# Patient Record
Sex: FEMALE | Race: WHITE | NOT HISPANIC OR LATINO | Employment: FULL TIME | URBAN - METROPOLITAN AREA
[De-identification: names, ages, dates, MRNs, and addresses within clinical notes are randomized per-mention and may not be internally consistent; named-entity substitution may affect disease eponyms.]

---

## 2017-10-06 ENCOUNTER — OFFICE VISIT (OUTPATIENT)
Dept: OBSTETRICS AND GYNECOLOGY | Facility: CLINIC | Age: 32
End: 2017-10-06

## 2017-10-06 VITALS
SYSTOLIC BLOOD PRESSURE: 112 MMHG | WEIGHT: 155 LBS | DIASTOLIC BLOOD PRESSURE: 70 MMHG | BODY MASS INDEX: 22.19 KG/M2 | HEIGHT: 70 IN

## 2017-10-06 DIAGNOSIS — Z01.419 WELL WOMAN EXAM WITH ROUTINE GYNECOLOGICAL EXAM: Primary | ICD-10-CM

## 2017-10-06 PROCEDURE — 99385 PREV VISIT NEW AGE 18-39: CPT | Performed by: OBSTETRICS & GYNECOLOGY

## 2017-10-06 NOTE — PROGRESS NOTES
Subjective   Brittany Barragan is a 32 y.o. female is here today as a self referral for annual.    History of Present Illness-here today for annual exam and checkup.  No contraception needed,  had vasectomy.    The following portions of the patient's history were reviewed and updated as appropriate: allergies, current medications, past family history, past medical history, past social history, past surgical history and problem list.    Review of Systems   Constitutional: Negative.    HENT: Negative.    Eyes: Negative.    Respiratory: Negative.    Cardiovascular: Negative.    Gastrointestinal: Negative.    Endocrine: Negative.    Genitourinary: Negative.    Musculoskeletal: Negative.    Skin: Negative.    Allergic/Immunologic: Negative.    Neurological: Negative.    Hematological: Negative.    Psychiatric/Behavioral: Negative.        Objective   Physical Exam   Constitutional: She is oriented to person, place, and time. She appears well-developed and well-nourished.   HENT:   Head: Normocephalic and atraumatic.   Nose: Nose normal.   Eyes: Conjunctivae and EOM are normal. Pupils are equal, round, and reactive to light.   Neck: Normal range of motion. Neck supple. No thyromegaly present.   Cardiovascular: Normal rate, regular rhythm, normal heart sounds and intact distal pulses.  Exam reveals no gallop.    No murmur heard.  Pulmonary/Chest: Effort normal and breath sounds normal. No respiratory distress. She has no wheezes. She exhibits no mass, no tenderness, no swelling and no retraction. Right breast exhibits no inverted nipple, no mass, no nipple discharge, no skin change and no tenderness. Left breast exhibits no inverted nipple, no mass, no nipple discharge, no skin change and no tenderness.   Abdominal: Soft. Bowel sounds are normal. She exhibits no distension and no mass. There is no tenderness.   Genitourinary: Rectum normal, vagina normal and uterus normal. There is no rash, tenderness, lesion or  injury on the right labia. There is no rash, tenderness, lesion or injury on the left labia. Uterus is not enlarged and not tender. Cervix exhibits no motion tenderness and no discharge. Right adnexum displays no mass, no tenderness and no fullness. Left adnexum displays no mass, no tenderness and no fullness.   Musculoskeletal: Normal range of motion. She exhibits no edema, tenderness or deformity.   Neurological: She is alert and oriented to person, place, and time.   Skin: Skin is warm and dry.   Psychiatric: She has a normal mood and affect. Her behavior is normal. Judgment and thought content normal.   Nursing note and vitals reviewed.        Assessment/Plan   Problems Addressed this Visit     None      Visit Diagnoses     Well woman exam with routine gynecological exam    -  Primary    Relevant Orders    IGP,rfx Aptima HPV All Pth - ThinPrep Vial, Cervix        Pap smear done today.

## 2017-10-10 LAB
CONV .: NORMAL
CYTOLOGIST CVX/VAG CYTO: NORMAL
CYTOLOGY CVX/VAG DOC THIN PREP: NORMAL
DX ICD CODE: NORMAL
HIV 1 & 2 AB SER-IMP: NORMAL
Lab: NORMAL
OTHER STN SPEC: NORMAL
PATH REPORT.FINAL DX SPEC: NORMAL
STAT OF ADQ CVX/VAG CYTO-IMP: NORMAL

## 2018-11-06 ENCOUNTER — OFFICE VISIT (OUTPATIENT)
Dept: OBSTETRICS AND GYNECOLOGY | Facility: CLINIC | Age: 33
End: 2018-11-06

## 2018-11-06 VITALS
BODY MASS INDEX: 23.62 KG/M2 | HEIGHT: 70 IN | DIASTOLIC BLOOD PRESSURE: 78 MMHG | WEIGHT: 165 LBS | SYSTOLIC BLOOD PRESSURE: 118 MMHG

## 2018-11-06 DIAGNOSIS — Z01.419 WELL WOMAN EXAM WITH ROUTINE GYNECOLOGICAL EXAM: Primary | ICD-10-CM

## 2018-11-06 DIAGNOSIS — Z34.02 ENCOUNTER FOR SUPERVISION OF NORMAL FIRST PREGNANCY IN SECOND TRIMESTER: ICD-10-CM

## 2018-11-06 LAB
BILIRUB BLD-MCNC: NEGATIVE MG/DL
CLARITY, POC: CLEAR
COLOR UR: YELLOW
GLUCOSE UR STRIP-MCNC: NEGATIVE MG/DL
KETONES UR QL: NEGATIVE
LEUKOCYTE EST, POC: NEGATIVE
NITRITE UR-MCNC: NEGATIVE MG/ML
PH UR: 7 [PH] (ref 5–8)
PROT UR STRIP-MCNC: NEGATIVE MG/DL
RBC # UR STRIP: NEGATIVE /UL
SP GR UR: 1.02 (ref 1–1.03)
UROBILINOGEN UR QL: NORMAL

## 2018-11-06 PROCEDURE — 81003 URINALYSIS AUTO W/O SCOPE: CPT | Performed by: NURSE PRACTITIONER

## 2018-11-06 PROCEDURE — 99395 PREV VISIT EST AGE 18-39: CPT | Performed by: NURSE PRACTITIONER

## 2018-11-06 RX ORDER — LEVONORGESTREL AND ETHINYL ESTRADIOL 0.1-0.02MG
1 KIT ORAL DAILY
Qty: 90 TABLET | Refills: 3 | Status: SHIPPED | OUTPATIENT
Start: 2018-11-06 | End: 2019-06-19

## 2018-11-06 NOTE — PROGRESS NOTES
[facility]  Routine Annual Visit    2018    Patient: Brittany Barragan          MR#:[id number]      History of Present Illness    33 y.o. female  who presents for annual exam. Last pap negative 10/2017. Using husbands vasectomy for contraception. Reports normal, monthly cycles- no change. Denies new medical hx. She does request to go back on oral contraceptives for cycle control and PMS symptoms. Has been on and off OCPs in the past with good experience.    No LMP recorded.  Obstetric History:  OB History      Para Term  AB Living    2 2 2     2    SAB TAB Ectopic Molar Multiple Live Births                        Menstrual History:     No LMP recorded.       Sexual History:       ________________________________________  There is no problem list on file for this patient.      Past Medical History:   Diagnosis Date   • Seasonal allergies        Past Surgical History:   Procedure Laterality Date   • BREAST AUGMENTATION     • NASAL POLYP SURGERY         History   Smoking Status   • Never Smoker   Smokeless Tobacco   • Never Used       Family History   Problem Relation Age of Onset   • Heart disease Father        Prior to Admission medications    Medication Sig Start Date End Date Taking? Authorizing Provider   Multiple Vitamin (MULTI VITAMIN DAILY PO) Take  by mouth.   Yes Provider, MD Niki     ________________________________________    The following portions of the patient's history were reviewed and updated as appropriate: allergies, current medications, past family history, past medical history, past social history, past surgical history and problem list.    Review of Systems   Constitutional: Negative.    HENT: Negative.    Eyes: Negative for visual disturbance.   Respiratory: Negative for cough, shortness of breath and wheezing.    Cardiovascular: Negative for chest pain, palpitations and leg swelling.   Gastrointestinal: Negative for abdominal distention,  "abdominal pain, blood in stool, constipation, diarrhea, nausea and vomiting.   Endocrine: Negative for cold intolerance and heat intolerance.   Genitourinary: Negative for difficulty urinating, dyspareunia, dysuria, frequency, genital sores, hematuria, menstrual problem, pelvic pain, urgency, vaginal bleeding, vaginal discharge and vaginal pain.   Musculoskeletal: Negative.    Skin: Negative.    Neurological: Negative for dizziness, weakness, light-headedness, numbness and headaches.   Hematological: Negative.    Psychiatric/Behavioral: Negative.    Breasts: negative for lumps skin changes, dimpling, swelling, nipple changes/discharge bilaterally       Objective   Physical Exam    Ht 177.8 cm (70\")   Wt 74.8 kg (165 lb)   BMI 23.68 kg/m²    BP Readings from Last 3 Encounters:   10/06/17 112/70   08/24/16 100/68      Wt Readings from Last 3 Encounters:   11/06/18 74.8 kg (165 lb)   10/06/17 70.3 kg (155 lb)   08/24/16 69.9 kg (154 lb)        BMI: Estimated body mass index is 23.68 kg/m² as calculated from the following:    Height as of this encounter: 177.8 cm (70\").    Weight as of this encounter: 74.8 kg (165 lb).      General:   alert, appears stated age and cooperative   Heart: regular rate and rhythm, S1, S2 normal, no murmur, click, rub or gallop   Lungs: clear to auscultation bilaterally   Abdomen: soft, non-tender, without masses or organomegaly   Breast: inspection negative, no nipple discharge or bleeding, no masses or nodularity palpable   Vulva: normal   Vagina: normal mucosa, normal discharge   Cervix: no cervical motion tenderness and no lesions   Uterus: normal size, mobile, non-tender or normal shape and consistency   Adnexa: no mass, fullness, tenderness     Assessment:    1. Normal annual exam   2. OCP: initiation of contraceptive pills. We have discussed the risks and benefits of oral contraceptives. We have discussed the health benefits of oral contraceptives including improvement of acne, " dysmenorrhea, PMS, decreased risk ovarian and uterine cancer later in life and decreased menstrual flow or suppression. We have also discussed possible side effects including nausea or vomiting at initiation, breast tenderness, and increased risk deep vein thrombosis. We have reviewed the ACHES (abdominal pain, chest pain, headache, leg pain, vision changes, shortness of breath) and to notify our office in the event of any of these symptoms.   3. Healthy lifestyle modifications discussed, counseled on self breast exams and bone health      Plan:    []  Rx:   []  Mammogram request made  [x]  PAP done  []  Occult fecal blood test ([facility])  []  Labs:   []  GC/Chl/TV  []  DEXA scan   []  Referral for colonoscopy:           AUDI Lane  11/6/2018 11:23 AM

## 2018-11-08 ENCOUNTER — TELEPHONE (OUTPATIENT)
Dept: OBSTETRICS AND GYNECOLOGY | Age: 33
End: 2018-11-08

## 2018-11-08 LAB
CYTOLOGIST CVX/VAG CYTO: NORMAL
CYTOLOGY CVX/VAG DOC THIN PREP: NORMAL
DX ICD CODE: NORMAL
HIV 1 & 2 AB SER-IMP: NORMAL
HPV I/H RISK 4 DNA CVX QL PROBE+SIG AMP: NEGATIVE
OTHER STN SPEC: NORMAL
PATH REPORT.FINAL DX SPEC: NORMAL
STAT OF ADQ CVX/VAG CYTO-IMP: NORMAL

## 2018-11-08 NOTE — TELEPHONE ENCOUNTER
----- Message from AUDI Mendiola sent at 11/8/2018 12:58 PM EST -----  Please inform patient her pap smear returned negative (normal). Thank you.

## 2019-01-04 ENCOUNTER — OFFICE VISIT (OUTPATIENT)
Dept: OBSTETRICS AND GYNECOLOGY | Facility: CLINIC | Age: 34
End: 2019-01-04

## 2019-01-04 VITALS
WEIGHT: 160 LBS | BODY MASS INDEX: 22.9 KG/M2 | HEIGHT: 70 IN | SYSTOLIC BLOOD PRESSURE: 108 MMHG | DIASTOLIC BLOOD PRESSURE: 64 MMHG

## 2019-01-04 DIAGNOSIS — N92.0 MENORRHAGIA WITH REGULAR CYCLE: Primary | ICD-10-CM

## 2019-01-04 PROCEDURE — 99215 OFFICE O/P EST HI 40 MIN: CPT | Performed by: OBSTETRICS & GYNECOLOGY

## 2019-01-04 NOTE — PROGRESS NOTES
HPI   Brittany Barragan  is a 33 y.o. female who presents for evaluation of menorrhagia as well as hot flashes, night sweats and mood swings.  She reports that she has experienced hot flashes and night sweats for several years.  She also has increasingly heavy periods.  She was evaluated by AUDI Mae.  She was started on oral contraceptive pills for control of her symptoms.  This has improved her cycle somewhat, but it has not improved the hot flashes, night sweats and mood swings.  The patient denies any other use of medications.  She denies any recent significant weight change.  Denies headaches or vision changes.  She does experience fatigue.  She also expresses cold intolerance.  This has neither worsened, nor has it improved in several years.    Chief Complaint   Patient presents with   • Follow-up     Patient is here for night sweats and is on birth control wants to discuss the birth control still having heavy cramping.       Past Medical History:   Diagnosis Date   • Seasonal allergies        Past Surgical History:   Procedure Laterality Date   • BREAST AUGMENTATION     • NASAL POLYP SURGERY         Social History     Socioeconomic History   • Marital status:      Spouse name: Karthik   • Number of children: 2   • Years of education: Not on file   • Highest education level: Not on file   Social Needs   • Financial resource strain: Not on file   • Food insecurity - worry: Not on file   • Food insecurity - inability: Not on file   • Transportation needs - medical: Not on file   • Transportation needs - non-medical: Not on file   Occupational History   • Occupation:    Tobacco Use   • Smoking status: Never Smoker   • Smokeless tobacco: Never Used   Substance and Sexual Activity   • Alcohol use: Yes     Comment: social drinker   • Drug use: No   • Sexual activity: Yes     Partners: Male     Birth control/protection: None   Other Topics Concern   • Not on file   Social History  Narrative   • Not on file       The following portions of the patient's history were reviewed and updated as appropriate: allergies, current medications, past family history, past medical history, past social history, past surgical history and problem list.    Review of Systems this is positive for night sweats, mood swings and hot flashes.  It is positive for cold intolerance and fatigue.  It is positive for menorrhagia.  It is negative for dysmenorrhea or dyspareunia.  All other systems are reviewed and are negative          Physical Exam   Constitutional: She appears well-developed and well-nourished.   Nursing note and vitals reviewed.      Assessment    Brittany was seen today for follow-up.    Diagnoses and all orders for this visit:    Menorrhagia with regular cycle  -     TSH  -     Prolactin        Plan  1. Menorrhagia with mood swings, hot flashes and night sweats.  Also cold intolerance and fatigue.  I counseled the patient regarding possible causes of this.  30 minutes of a 45 minute visit were spent in direct face-to-face counseling on this issue.  The patient does not require contraception, as her  has a vasectomy.  She would like to stop her oral contraceptive pills.  I agree with this thought.  Also, her symptoms are consistent with possible hypothyroidism.  I recommend a thyroid-stimulating hormone as well as prolactin today.  If this shows evidence of hypothyroidism, endocrine referral can be made.  If the tests are negative, we can check a follicle-stimulating hormone after the patient is been off of her oral contraceptive pills for approximately 1 month.  Also, an ultrasound can be performed to assess the endometrium and the adnexa.  If all of this is normal, efforts can be made to control symptoms.  Otherwise, efforts will be focused on the cause of the problem.  I answered the patient's questions and she agrees with these recommendations.    Social History     Tobacco Use   Smoking Status  Never Smoker   2.     3.

## 2019-01-05 LAB
PROLACTIN SERPL-MCNC: 6.5 NG/ML (ref 4.8–23.3)
TSH SERPL DL<=0.005 MIU/L-ACNC: 1.61 MIU/ML (ref 0.27–4.2)

## 2019-01-09 ENCOUNTER — TELEPHONE (OUTPATIENT)
Dept: OBSTETRICS AND GYNECOLOGY | Facility: CLINIC | Age: 34
End: 2019-01-09

## 2019-04-12 ENCOUNTER — TELEPHONE (OUTPATIENT)
Dept: OBSTETRICS AND GYNECOLOGY | Facility: CLINIC | Age: 34
End: 2019-04-12

## 2019-04-12 NOTE — TELEPHONE ENCOUNTER
In order to do an endometrial ablation, the patient will need preop counseling as well as an ultrasound and an endometrial biopsy.  I could not find where any of those things have already been done.  Please contact the patient and schedule her for a visit.  We can do the ultrasound and the endometrial biopsy at that visit.  Thank you.

## 2019-04-12 NOTE — TELEPHONE ENCOUNTER
Pt called, after last visit with you. Pt would like to go ahead with ablation procedure. Do you need to see pt in office? Does pt akity u/s or anything? Please advise.       Pt callback: [phone]

## 2019-05-21 ENCOUNTER — OFFICE VISIT (OUTPATIENT)
Dept: OBSTETRICS AND GYNECOLOGY | Facility: CLINIC | Age: 34
End: 2019-05-21

## 2019-05-21 ENCOUNTER — PREP FOR SURGERY (OUTPATIENT)
Dept: OTHER | Facility: HOSPITAL | Age: 34
End: 2019-05-21

## 2019-05-21 ENCOUNTER — PROCEDURE VISIT (OUTPATIENT)
Dept: OBSTETRICS AND GYNECOLOGY | Facility: CLINIC | Age: 34
End: 2019-05-21

## 2019-05-21 VITALS
WEIGHT: 165 LBS | HEIGHT: 70 IN | BODY MASS INDEX: 23.62 KG/M2 | DIASTOLIC BLOOD PRESSURE: 64 MMHG | SYSTOLIC BLOOD PRESSURE: 116 MMHG

## 2019-05-21 DIAGNOSIS — N92.0 MENORRHAGIA WITH REGULAR CYCLE: ICD-10-CM

## 2019-05-21 DIAGNOSIS — N92.0 MENORRHAGIA WITH REGULAR CYCLE: Primary | ICD-10-CM

## 2019-05-21 PROCEDURE — 99214 OFFICE O/P EST MOD 30 MIN: CPT | Performed by: OBSTETRICS & GYNECOLOGY

## 2019-05-21 PROCEDURE — 58100 BIOPSY OF UTERUS LINING: CPT | Performed by: OBSTETRICS & GYNECOLOGY

## 2019-05-21 PROCEDURE — 76830 TRANSVAGINAL US NON-OB: CPT | Performed by: OBSTETRICS & GYNECOLOGY

## 2019-05-21 RX ORDER — SODIUM CHLORIDE 0.9 % (FLUSH) 0.9 %
3-10 SYRINGE (ML) INJECTION AS NEEDED
Status: CANCELLED | OUTPATIENT
Start: 2019-07-01

## 2019-05-21 RX ORDER — KETOROLAC TROMETHAMINE 15.75 MG/1
SPRAY, METERED NASAL
Qty: 5 EACH | Refills: 0 | Status: SHIPPED | OUTPATIENT
Start: 2019-05-21

## 2019-05-21 RX ORDER — SODIUM CHLORIDE 0.9 % (FLUSH) 0.9 %
3 SYRINGE (ML) INJECTION EVERY 12 HOURS SCHEDULED
Status: CANCELLED | OUTPATIENT
Start: 2019-07-01

## 2019-05-21 NOTE — PROGRESS NOTES
HPI   Brittany Barragan  is a 34 y.o. female who presents to discuss the possibility of an endometrial ablation for her menstrual hemorrhage.  She has tried oral contraceptive pills, a Mirena IUD and Depo-Provera.  All of these have failed.  Ultrasound was performed today.  It was reviewed and discussed with the patient.  Endometrial cavity was 9.34 cm.  There were no adnexal masses.  There is a possible uterine septum.  Also, we discussed my rationale for recommending endometrial biopsy.  We discussed the benefits risks and alternatives to this and I answered the patient's questions.  She chose to proceed.    Chief Complaint   Patient presents with   • Follow-up     Patient is here for a f/u for abnormal heavy periods.       Past Medical History:   Diagnosis Date   • Seasonal allergies        Past Surgical History:   Procedure Laterality Date   • BREAST AUGMENTATION     • NASAL POLYP SURGERY         Social History     Socioeconomic History   • Marital status:      Spouse name: Karthik   • Number of children: 2   • Years of education: Not on file   • Highest education level: Not on file   Occupational History   • Occupation:    Tobacco Use   • Smoking status: Never Smoker   • Smokeless tobacco: Never Used   Substance and Sexual Activity   • Alcohol use: Yes     Comment: social drinker   • Drug use: No   • Sexual activity: Yes     Partners: Male     Birth control/protection: None       The following portions of the patient's history were reviewed and updated as appropriate: allergies, current medications, past family history, past medical history, past social history, past surgical history and problem list.    Review of Systems this is positive for menorrhagia and dysmenorrhea.  It is negative for dyspareunia.  It is negative for unexplained weight change.  It is negative for fever or chills.  All other systems are reviewed and are negative          Physical Exam   Constitutional: She is oriented  to person, place, and time. She appears well-developed and well-nourished.   Abdominal:   Soft, nondistended.  The abdomen is nontender to palpation.  There are no masses palpable   Genitourinary:   Genitourinary Comments: Normal female external genitalia  The vagina is estrogenized and without lesion  The uterus is mobile within the pelvis.  It is nontender  No adnexal masses are palpable    Endometrial biopsy was performed using the Pipelle instrument.  Specimen to pathology.  The patient tolerated the procedure well.   Neurological: She is alert and oriented to person, place, and time.   Skin: Skin is warm and dry.   Psychiatric: She has a normal mood and affect. Her behavior is normal.   Vitals reviewed.      Assessment    Brittany was seen today for follow-up.    Diagnoses and all orders for this visit:    Menorrhagia with regular cycle  -     Endometrial Biopsy    Other orders  -     Ketorolac Tromethamine (SPRIX) 15.75 MG/SPRAY solution; 1 spray to each nostril every 8 hours as needed for pain        Plan  1. Endometrial biopsy was performed as described above.  The patient tolerated the procedure well.  2. Counseled regarding options for the management of menorrhagia.  The patient would like to proceed with an endometrial ablation.  We reviewed diagrams together and discussed the benefits, risks and alternatives.  We also discussed the procedure itself.  25 minutes of a 40-minute visit were spent in direct face-to-face counseling on this issue.  We also discussed perioperative pain control.  We discussed the benefits and risks of using Sprix.  The patient would like to proceed.  A prescription has been sent.    Social History     Tobacco Use   Smoking Status Never Smoker   3.     4.

## 2019-05-29 LAB
DX ICD CODE: NORMAL
PATH REPORT.FINAL DX SPEC: NORMAL
PATH REPORT.GROSS SPEC: NORMAL
PATH REPORT.SITE OF ORIGIN SPEC: NORMAL
PATHOLOGIST NAME: NORMAL
PAYMENT PROCEDURE: NORMAL

## 2019-05-30 PROBLEM — N92.0 MENORRHAGIA WITH REGULAR CYCLE: Status: ACTIVE | Noted: 2019-05-30

## 2019-05-31 ENCOUNTER — TELEPHONE (OUTPATIENT)
Dept: OBSTETRICS AND GYNECOLOGY | Facility: CLINIC | Age: 34
End: 2019-05-31

## 2019-05-31 NOTE — TELEPHONE ENCOUNTER
Pt called for results and upcoming procedure. Informed pt of tentative PAT and procedure date. But would like speak to  one of the schedulers.       Thank you

## 2019-05-31 NOTE — TELEPHONE ENCOUNTER
----- Message from Davi Jaarmillo MD sent at 5/30/2019  4:30 PM EDT -----  Manju - Let her know that her biopsy was normal and there was no cancer.  She should call Dr Steiner next week if she wants to proceed with ablation.  He will be back from vacation.

## 2019-06-05 ENCOUNTER — TELEPHONE (OUTPATIENT)
Dept: OBSTETRICS AND GYNECOLOGY | Facility: CLINIC | Age: 34
End: 2019-06-05

## 2019-06-19 ENCOUNTER — APPOINTMENT (OUTPATIENT)
Dept: PREADMISSION TESTING | Facility: HOSPITAL | Age: 34
End: 2019-06-19

## 2019-06-19 VITALS
TEMPERATURE: 98.3 F | WEIGHT: 161 LBS | OXYGEN SATURATION: 99 % | BODY MASS INDEX: 23.05 KG/M2 | HEART RATE: 66 BPM | HEIGHT: 70 IN | RESPIRATION RATE: 20 BRPM

## 2019-06-19 LAB
ANION GAP SERPL CALCULATED.3IONS-SCNC: 9.3 MMOL/L
BUN BLD-MCNC: 17 MG/DL (ref 6–20)
BUN/CREAT SERPL: 24.3 (ref 7–25)
CALCIUM SPEC-SCNC: 9.2 MG/DL (ref 8.6–10.5)
CHLORIDE SERPL-SCNC: 106 MMOL/L (ref 98–107)
CO2 SERPL-SCNC: 24.7 MMOL/L (ref 22–29)
CREAT BLD-MCNC: 0.7 MG/DL (ref 0.57–1)
DEPRECATED RDW RBC AUTO: 39.8 FL (ref 37–54)
ERYTHROCYTE [DISTWIDTH] IN BLOOD BY AUTOMATED COUNT: 11.8 % (ref 12.3–15.4)
GFR SERPL CREATININE-BSD FRML MDRD: 96 ML/MIN/1.73
GLUCOSE BLD-MCNC: 103 MG/DL (ref 65–99)
HCT VFR BLD AUTO: 38.7 % (ref 34–46.6)
HGB BLD-MCNC: 12.5 G/DL (ref 12–15.9)
MCH RBC QN AUTO: 30 PG (ref 26.6–33)
MCHC RBC AUTO-ENTMCNC: 32.3 G/DL (ref 31.5–35.7)
MCV RBC AUTO: 92.8 FL (ref 79–97)
PLATELET # BLD AUTO: 172 10*3/MM3 (ref 140–450)
PMV BLD AUTO: 10.6 FL (ref 6–12)
POTASSIUM BLD-SCNC: 4.5 MMOL/L (ref 3.5–5.2)
RBC # BLD AUTO: 4.17 10*6/MM3 (ref 3.77–5.28)
SODIUM BLD-SCNC: 140 MMOL/L (ref 136–145)
WBC NRBC COR # BLD: 3.97 10*3/MM3 (ref 3.4–10.8)

## 2019-06-19 PROCEDURE — 85027 COMPLETE CBC AUTOMATED: CPT | Performed by: OBSTETRICS & GYNECOLOGY

## 2019-06-19 PROCEDURE — 36415 COLL VENOUS BLD VENIPUNCTURE: CPT

## 2019-06-19 PROCEDURE — 80048 BASIC METABOLIC PNL TOTAL CA: CPT | Performed by: OBSTETRICS & GYNECOLOGY

## 2019-06-19 RX ORDER — IBUPROFEN 200 MG
400-800 TABLET ORAL EVERY 6 HOURS PRN
COMMUNITY
End: 2019-07-01 | Stop reason: HOSPADM

## 2019-06-19 NOTE — DISCHARGE INSTRUCTIONS
Take the following medications the morning of surgery with a small sip of water:    none    General Instructions:  • Do not eat solid food after midnight the night before surgery.  • You may drink clear liquids day of surgery but must stop at least one hour before your hospital arrival time.  • It is beneficial for you to have a clear drink that contains carbohydrates the day of surgery.  We suggest a 12 to 20 ounce bottle of Gatorade or Powerade for non-diabetic patients or a 12 to 20 ounce bottle of G2 or Powerade Zero for diabetic patients. (Pediatric patients, are not advised to drink a 12 to 20 ounce carbohydrate drink)    Clear liquids are liquids you can see through.  Nothing red in color.     Plain water                               Sports drinks  Sodas                                   Gelatin (Jell-O)  Fruit juices without pulp such as white grape juice and apple juice  Popsicles that contain no fruit or yogurt  Tea or coffee (no cream or milk added)  Gatorade / Powerade  G2 / Powerade Zero    • Infants may have breast milk up to four hours before surgery.  • Infants drinking formula may drink formula up to six hours before surgery.   • Patients who avoid smoking, chewing tobacco and alcohol for 4 weeks prior to surgery have a reduced risk of post-operative complications.  Quit smoking as many days before surgery as you can.  • Do not smoke, use chewing tobacco or drink alcohol the day of surgery.   • If applicable bring your C-PAP/ BI-PAP machine.  • Bring any papers given to you in the doctor’s office.  • Wear clean comfortable clothes and socks.  • Do not wear contact lenses, false eyelashes or make-up.  Bring a case for your glasses.   • Bring crutches or walker if applicable.  • Remove all piercings.  Leave jewelry and any other valuables at home.  • Hair extensions with metal clips must be removed prior to surgery.  • The Pre-Admission Testing nurse will instruct you to bring medications if unable  to obtain an accurate list in Pre-Admission Testing.        If you were given a blood bank ID arm band remember to bring it with you the day of surgery.    Preventing a Surgical Site Infection:  • For 2 to 3 days before surgery, avoid shaving with a razor because the razor can irritate skin and make it easier to develop an infection.    • Any areas of open skin can increase the risk of a post-operative wound infection by allowing bacteria to enter and travel throughout the body.  Notify your surgeon if you have any skin wounds / rashes even if it is not near the expected surgical site.  The area will need assessed to determine if surgery should be delayed until it is healed.  • The night prior to surgery sleep in a clean bed with clean clothing.  Do not allow pets to sleep with you.  • Shower on the morning of surgery using a fresh bar of anti-bacterial soap (such as Dial) and clean washcloth.  Dry with a clean towel and dress in clean clothing.  • Ask your surgeon if you will be receiving antibiotics prior to surgery.  • Make sure you, your family, and all healthcare providers clean their hands with soap and water or an alcohol based hand  before caring for you or your wound.    Day of surgery:7/1/19  Upon arrival, a Pre-op nurse and Anesthesiologist will review your health history, obtain vital signs, and answer questions you may have.  The only belongings needed at this time will be a list of your home medications and if applicable your C-PAP/BI-PAP machine.  If you are staying overnight your family can leave the rest of your belongings in the car and bring them to your room later.  A Pre-op nurse will start an IV and you may receive medication in preparation for surgery, including something to help you relax.  Your family will be able to see you in the Pre-op area.  While you are in surgery your family should notify the waiting room  if they leave the waiting room area and provide a contact  phone number.    Please be aware that surgery does come with discomfort.  We want to make every effort to control your discomfort so please discuss any uncontrolled symptoms with your nurse.   Your doctor will most likely have prescribed pain medications.      If you are going home after surgery you will receive individualized written care instructions before being discharged.  A responsible adult must drive you to and from the hospital on the day of your surgery and stay with you for 24 hours.    If you are staying overnight following surgery, you will be transported to your hospital room following the recovery period.  [facility] has all private rooms.    You have received a list of surgical assistants for your reference.  If you have any questions please call Pre-Admission Testing at [phone].  Deductibles and co-payments are collected on the day of service. Please be prepared to pay the required co-pay, deductible or deposit on the day of service as defined by your plan.

## 2019-07-01 ENCOUNTER — ANESTHESIA EVENT (OUTPATIENT)
Dept: PERIOP | Facility: HOSPITAL | Age: 34
End: 2019-07-01

## 2019-07-01 ENCOUNTER — ANESTHESIA (OUTPATIENT)
Dept: PERIOP | Facility: HOSPITAL | Age: 34
End: 2019-07-01

## 2019-07-01 ENCOUNTER — HOSPITAL ENCOUNTER (OUTPATIENT)
Facility: HOSPITAL | Age: 34
Discharge: HOME | End: 2019-07-01
Attending: OBSTETRICS & GYNECOLOGY | Admitting: OBSTETRICS & GYNECOLOGY

## 2019-07-01 VITALS
HEART RATE: 62 BPM | RESPIRATION RATE: 16 BRPM | TEMPERATURE: 97.9 F | HEIGHT: 70 IN | OXYGEN SATURATION: 98 % | BODY MASS INDEX: 23.55 KG/M2 | SYSTOLIC BLOOD PRESSURE: 115 MMHG | DIASTOLIC BLOOD PRESSURE: 75 MMHG | WEIGHT: 164.46 LBS

## 2019-07-01 DIAGNOSIS — N92.0 MENORRHAGIA WITH REGULAR CYCLE: ICD-10-CM

## 2019-07-01 LAB
B-HCG UR QL: NEGATIVE
INTERNAL NEGATIVE CONTROL: NEGATIVE
INTERNAL POSITIVE CONTROL: POSITIVE
Lab: NORMAL

## 2019-07-01 PROCEDURE — 25010000002 PROPOFOL 10 MG/ML EMULSION: Performed by: NURSE ANESTHETIST, CERTIFIED REGISTERED

## 2019-07-01 PROCEDURE — 25010000002 DEXAMETHASONE PER 1 MG: Performed by: NURSE ANESTHETIST, CERTIFIED REGISTERED

## 2019-07-01 PROCEDURE — 25010000002 MIDAZOLAM PER 1 MG: Performed by: ANESTHESIOLOGY

## 2019-07-01 PROCEDURE — 58563 HYSTEROSCOPY ABLATION: CPT | Performed by: OBSTETRICS & GYNECOLOGY

## 2019-07-01 PROCEDURE — 25010000002 FENTANYL CITRATE (PF) 100 MCG/2ML SOLUTION: Performed by: NURSE ANESTHETIST, CERTIFIED REGISTERED

## 2019-07-01 PROCEDURE — 25010000002 ONDANSETRON PER 1 MG: Performed by: NURSE ANESTHETIST, CERTIFIED REGISTERED

## 2019-07-01 PROCEDURE — 81025 URINE PREGNANCY TEST: CPT | Performed by: OBSTETRICS & GYNECOLOGY

## 2019-07-01 PROCEDURE — 25010000002 ROPIVACAINE PER 1 MG: Performed by: OBSTETRICS & GYNECOLOGY

## 2019-07-01 PROCEDURE — 25010000002 KETOROLAC TROMETHAMINE PER 15 MG: Performed by: NURSE ANESTHETIST, CERTIFIED REGISTERED

## 2019-07-01 PROCEDURE — S0260 H&P FOR SURGERY: HCPCS | Performed by: OBSTETRICS & GYNECOLOGY

## 2019-07-01 RX ORDER — NALOXONE HCL 0.4 MG/ML
0.2 VIAL (ML) INJECTION AS NEEDED
Status: DISCONTINUED | OUTPATIENT
Start: 2019-07-01 | End: 2019-07-01 | Stop reason: HOSPADM

## 2019-07-01 RX ORDER — PROMETHAZINE HYDROCHLORIDE 25 MG/ML
12.5 INJECTION, SOLUTION INTRAMUSCULAR; INTRAVENOUS ONCE AS NEEDED
Status: DISCONTINUED | OUTPATIENT
Start: 2019-07-01 | End: 2019-07-01 | Stop reason: HOSPADM

## 2019-07-01 RX ORDER — ACETAMINOPHEN 325 MG/1
650 TABLET ORAL ONCE AS NEEDED
Status: DISCONTINUED | OUTPATIENT
Start: 2019-07-01 | End: 2019-07-01 | Stop reason: HOSPADM

## 2019-07-01 RX ORDER — OXYCODONE AND ACETAMINOPHEN 7.5; 325 MG/1; MG/1
1 TABLET ORAL ONCE AS NEEDED
Status: DISCONTINUED | OUTPATIENT
Start: 2019-07-01 | End: 2019-07-01 | Stop reason: HOSPADM

## 2019-07-01 RX ORDER — MIDAZOLAM HYDROCHLORIDE 1 MG/ML
1 INJECTION INTRAMUSCULAR; INTRAVENOUS
Status: DISCONTINUED | OUTPATIENT
Start: 2019-07-01 | End: 2019-07-01 | Stop reason: HOSPADM

## 2019-07-01 RX ORDER — EPHEDRINE SULFATE 50 MG/ML
5 INJECTION, SOLUTION INTRAVENOUS ONCE AS NEEDED
Status: DISCONTINUED | OUTPATIENT
Start: 2019-07-01 | End: 2019-07-01 | Stop reason: HOSPADM

## 2019-07-01 RX ORDER — FLUMAZENIL 0.1 MG/ML
0.2 INJECTION INTRAVENOUS AS NEEDED
Status: DISCONTINUED | OUTPATIENT
Start: 2019-07-01 | End: 2019-07-01 | Stop reason: HOSPADM

## 2019-07-01 RX ORDER — DIPHENHYDRAMINE HCL 25 MG
25 CAPSULE ORAL
Status: DISCONTINUED | OUTPATIENT
Start: 2019-07-01 | End: 2019-07-01 | Stop reason: HOSPADM

## 2019-07-01 RX ORDER — SODIUM CHLORIDE 0.9 % (FLUSH) 0.9 %
3-10 SYRINGE (ML) INJECTION AS NEEDED
Status: DISCONTINUED | OUTPATIENT
Start: 2019-07-01 | End: 2019-07-01 | Stop reason: HOSPADM

## 2019-07-01 RX ORDER — FENTANYL CITRATE 50 UG/ML
INJECTION, SOLUTION INTRAMUSCULAR; INTRAVENOUS AS NEEDED
Status: DISCONTINUED | OUTPATIENT
Start: 2019-07-01 | End: 2019-07-01 | Stop reason: SURG

## 2019-07-01 RX ORDER — DEXAMETHASONE SODIUM PHOSPHATE 10 MG/ML
INJECTION INTRAMUSCULAR; INTRAVENOUS AS NEEDED
Status: DISCONTINUED | OUTPATIENT
Start: 2019-07-01 | End: 2019-07-01 | Stop reason: SURG

## 2019-07-01 RX ORDER — SODIUM CHLORIDE, SODIUM LACTATE, POTASSIUM CHLORIDE, CALCIUM CHLORIDE 600; 310; 30; 20 MG/100ML; MG/100ML; MG/100ML; MG/100ML
9 INJECTION, SOLUTION INTRAVENOUS CONTINUOUS
Status: DISCONTINUED | OUTPATIENT
Start: 2019-07-01 | End: 2019-07-01 | Stop reason: HOSPADM

## 2019-07-01 RX ORDER — HYDROCODONE BITARTRATE AND ACETAMINOPHEN 7.5; 325 MG/1; MG/1
1 TABLET ORAL ONCE AS NEEDED
Status: COMPLETED | OUTPATIENT
Start: 2019-07-01 | End: 2019-07-01

## 2019-07-01 RX ORDER — DIPHENHYDRAMINE HYDROCHLORIDE 50 MG/ML
12.5 INJECTION INTRAMUSCULAR; INTRAVENOUS
Status: DISCONTINUED | OUTPATIENT
Start: 2019-07-01 | End: 2019-07-01 | Stop reason: HOSPADM

## 2019-07-01 RX ORDER — PROMETHAZINE HYDROCHLORIDE 25 MG/ML
6.25 INJECTION, SOLUTION INTRAMUSCULAR; INTRAVENOUS
Status: DISCONTINUED | OUTPATIENT
Start: 2019-07-01 | End: 2019-07-01 | Stop reason: HOSPADM

## 2019-07-01 RX ORDER — SODIUM CHLORIDE 9 MG/ML
INJECTION, SOLUTION INTRAVENOUS AS NEEDED
Status: DISCONTINUED | OUTPATIENT
Start: 2019-07-01 | End: 2019-07-01 | Stop reason: HOSPADM

## 2019-07-01 RX ORDER — HYDROMORPHONE HYDROCHLORIDE 1 MG/ML
0.5 INJECTION, SOLUTION INTRAMUSCULAR; INTRAVENOUS; SUBCUTANEOUS
Status: DISCONTINUED | OUTPATIENT
Start: 2019-07-01 | End: 2019-07-01 | Stop reason: HOSPADM

## 2019-07-01 RX ORDER — ONDANSETRON 2 MG/ML
INJECTION INTRAMUSCULAR; INTRAVENOUS AS NEEDED
Status: DISCONTINUED | OUTPATIENT
Start: 2019-07-01 | End: 2019-07-01 | Stop reason: SURG

## 2019-07-01 RX ORDER — PROMETHAZINE HYDROCHLORIDE 25 MG/1
25 SUPPOSITORY RECTAL ONCE AS NEEDED
Status: DISCONTINUED | OUTPATIENT
Start: 2019-07-01 | End: 2019-07-01 | Stop reason: HOSPADM

## 2019-07-01 RX ORDER — ROPIVACAINE HYDROCHLORIDE 5 MG/ML
INJECTION, SOLUTION EPIDURAL; INFILTRATION; PERINEURAL AS NEEDED
Status: DISCONTINUED | OUTPATIENT
Start: 2019-07-01 | End: 2019-07-01 | Stop reason: HOSPADM

## 2019-07-01 RX ORDER — ONDANSETRON 2 MG/ML
4 INJECTION INTRAMUSCULAR; INTRAVENOUS ONCE AS NEEDED
Status: DISCONTINUED | OUTPATIENT
Start: 2019-07-01 | End: 2019-07-01 | Stop reason: HOSPADM

## 2019-07-01 RX ORDER — MIDAZOLAM HYDROCHLORIDE 1 MG/ML
2 INJECTION INTRAMUSCULAR; INTRAVENOUS
Status: DISCONTINUED | OUTPATIENT
Start: 2019-07-01 | End: 2019-07-01 | Stop reason: HOSPADM

## 2019-07-01 RX ORDER — FENTANYL CITRATE 50 UG/ML
50 INJECTION, SOLUTION INTRAMUSCULAR; INTRAVENOUS
Status: DISCONTINUED | OUTPATIENT
Start: 2019-07-01 | End: 2019-07-01 | Stop reason: HOSPADM

## 2019-07-01 RX ORDER — LABETALOL HYDROCHLORIDE 5 MG/ML
5 INJECTION, SOLUTION INTRAVENOUS
Status: DISCONTINUED | OUTPATIENT
Start: 2019-07-01 | End: 2019-07-01 | Stop reason: HOSPADM

## 2019-07-01 RX ORDER — KETOROLAC TROMETHAMINE 30 MG/ML
INJECTION, SOLUTION INTRAMUSCULAR; INTRAVENOUS AS NEEDED
Status: DISCONTINUED | OUTPATIENT
Start: 2019-07-01 | End: 2019-07-01 | Stop reason: SURG

## 2019-07-01 RX ORDER — LIDOCAINE HYDROCHLORIDE 10 MG/ML
0.5 INJECTION, SOLUTION EPIDURAL; INFILTRATION; INTRACAUDAL; PERINEURAL ONCE AS NEEDED
Status: DISCONTINUED | OUTPATIENT
Start: 2019-07-01 | End: 2019-07-01 | Stop reason: HOSPADM

## 2019-07-01 RX ORDER — PROMETHAZINE HYDROCHLORIDE 25 MG/1
25 TABLET ORAL ONCE AS NEEDED
Status: DISCONTINUED | OUTPATIENT
Start: 2019-07-01 | End: 2019-07-01 | Stop reason: HOSPADM

## 2019-07-01 RX ORDER — PROPOFOL 10 MG/ML
VIAL (ML) INTRAVENOUS AS NEEDED
Status: DISCONTINUED | OUTPATIENT
Start: 2019-07-01 | End: 2019-07-01 | Stop reason: SURG

## 2019-07-01 RX ORDER — HYDRALAZINE HYDROCHLORIDE 20 MG/ML
5 INJECTION INTRAMUSCULAR; INTRAVENOUS
Status: DISCONTINUED | OUTPATIENT
Start: 2019-07-01 | End: 2019-07-01 | Stop reason: HOSPADM

## 2019-07-01 RX ORDER — SODIUM CHLORIDE 0.9 % (FLUSH) 0.9 %
1-10 SYRINGE (ML) INJECTION AS NEEDED
Status: DISCONTINUED | OUTPATIENT
Start: 2019-07-01 | End: 2019-07-01 | Stop reason: HOSPADM

## 2019-07-01 RX ORDER — FAMOTIDINE 10 MG/ML
20 INJECTION, SOLUTION INTRAVENOUS ONCE
Status: COMPLETED | OUTPATIENT
Start: 2019-07-01 | End: 2019-07-01

## 2019-07-01 RX ORDER — SODIUM CHLORIDE 0.9 % (FLUSH) 0.9 %
3 SYRINGE (ML) INJECTION EVERY 12 HOURS SCHEDULED
Status: DISCONTINUED | OUTPATIENT
Start: 2019-07-01 | End: 2019-07-01 | Stop reason: HOSPADM

## 2019-07-01 RX ADMIN — MIDAZOLAM HYDROCHLORIDE 1 MG: 1 INJECTION, SOLUTION INTRAMUSCULAR; INTRAVENOUS at 13:34

## 2019-07-01 RX ADMIN — DEXAMETHASONE SODIUM PHOSPHATE 4 MG: 10 INJECTION INTRAMUSCULAR; INTRAVENOUS at 14:07

## 2019-07-01 RX ADMIN — FAMOTIDINE 20 MG: 10 INJECTION INTRAVENOUS at 13:34

## 2019-07-01 RX ADMIN — ONDANSETRON 4 MG: 2 INJECTION INTRAMUSCULAR; INTRAVENOUS at 14:07

## 2019-07-01 RX ADMIN — SODIUM CHLORIDE, POTASSIUM CHLORIDE, SODIUM LACTATE AND CALCIUM CHLORIDE 9 ML/HR: 600; 310; 30; 20 INJECTION, SOLUTION INTRAVENOUS at 13:34

## 2019-07-01 RX ADMIN — FENTANYL CITRATE 50 MCG: 50 INJECTION INTRAMUSCULAR; INTRAVENOUS at 14:04

## 2019-07-01 RX ADMIN — HYDROCODONE BITARTRATE AND ACETAMINOPHEN 1 TABLET: 7.5; 325 TABLET ORAL at 15:10

## 2019-07-01 RX ADMIN — FENTANYL CITRATE 50 MCG: 50 INJECTION INTRAMUSCULAR; INTRAVENOUS at 13:53

## 2019-07-01 RX ADMIN — PROPOFOL 200 MG: 10 INJECTION, EMULSION INTRAVENOUS at 13:53

## 2019-07-01 RX ADMIN — KETOROLAC TROMETHAMINE 30 MG: 30 INJECTION, SOLUTION INTRAMUSCULAR; INTRAVENOUS at 14:19

## 2019-07-01 NOTE — ANESTHESIA PREPROCEDURE EVALUATION
Anesthesia Evaluation     Patient summary reviewed and Nursing notes reviewed                Airway   Mallampati: I  TM distance: >3 FB  Neck ROM: full  No difficulty expected  Dental - normal exam     Pulmonary - negative pulmonary ROS and normal exam   Cardiovascular - negative cardio ROS and normal exam        Neuro/Psych- negative ROS  GI/Hepatic/Renal/Endo - negative ROS     Musculoskeletal (-) negative ROS    Abdominal  - normal exam    Bowel sounds: normal.   Substance History - negative use     OB/GYN negative ob/gyn ROS         Other                        Anesthesia Plan    ASA 1     general     intravenous induction   Anesthetic plan, all risks, benefits, and alternatives have been provided, discussed and informed consent has been obtained with: patient.

## 2019-07-01 NOTE — OP NOTE
Operative Note      Brittany Barragan  34 y.o.  1985  female  [id number]      7/1/2019    Surgeon(s) and Role:     * Karthik Steiner MD - Primary     Pre-op Diagnosis:   Menorrhagia with regular cycle [N92.0]    Post-Op Diagnosis Codes:     * Menorrhagia with regular cycle [N92.0]        Findings/Complications:  Findings.  Small uterine septum.  No complications    Description of procedure:  Procedure(s) and Anesthesia Type:     * HYSTEROSCOPY NOVASURE ENDOMETRIAL ABLATION - General  The patient was taken to the operating room where general anesthesia was induced.  She was then placed in dorsal lithotomy position using the candycane stirrups.  Examination under anesthesia revealed a normal-sized uterus.  It was mobile within the pelvis.  There were no adnexal masses palpable.      The perineum and pelvis were prepped and draped in the usual sterile fashion and a weighted speculum was placed for exposure.  Cervix was grasped with a long Allis clamp and paracervical block was placed using naropine.  The cervix was then dilated using Hanks dilators and the diagnostic mini hysteroscope was introduced into the endometrial cavity.  The cavity was intact.  There was a small uterine septum.  Both tubal ostia were clearly visualized.  There was no pathology in the endometrial cavity aside from the septum.  The hysteroscope was then slowly withdrawn and no additional pathology was encountered on the way out.      The cervix was then sounded using the NovaSure sounding device.  This revealed an endometrial cavity length minus the cervix of 5 cm.  The NovaSure device was then tested and introduced into the endometrial cavity.  An endometrial cavity width of 4.7 was determined.  A cavity integrity test was performed and there was no perforation.  A NovaSure ablation was then performed.  The procedure was continued for 1 minute 15 seconds.  A power of 129 W was used.      The NovaSure device was then removed from the  endometrial cavity.  Hysteroscope was reintroduced.  This showed that the cavity was intact and an excellent ablation had been obtained.  The hysteroscope was then withdrawn.  All instruments were removed and the operative site was examined.  It was hemostatic.  Anesthesia= General    Estimated Blood Loss: 25 mL    Specimens: * No orders in the log *    [unfilled]      Karthik Steiner MD  7/1/2019

## 2019-07-01 NOTE — H&P
H&P Note    Patient Identification:  Name: Brittany Barragan  Age: 34 y.o.  Sex: female  :  1985  MRN: [id number]                       Chief Complaint: Menorrhagia    History of Present Illness:   34-year-old lady who has been followed in the office for menorrhagia.  This did not respond to conservative measures.  We discussed options and the patient would like to proceed with an endometrial ablation.  She has been counseled regarding the benefits, risks and alternatives to this and I have answered her questions.  She would like to proceed.    Problem List:  [unfilled]  Past Medical History:  Past Medical History:   Diagnosis Date   • Seasonal allergies      Past Surgical History:  Past Surgical History:   Procedure Laterality Date   • BREAST AUGMENTATION     • BREAST AUGMENTATION     • EXPLORATORY LAPAROTOMY     • NASAL POLYP SURGERY        Home Meds:  Medications Prior to Admission   Medication Sig Dispense Refill Last Dose   • Multiple Vitamin (MULTI VITAMIN DAILY PO) Take 1 tablet by mouth Daily.   Past Week at Unknown time   • ibuprofen (ADVIL,MOTRIN) 200 MG tablet Take 400-800 mg by mouth Every 6 (Six) Hours As Needed for Mild Pain .   Unknown at Unknown time   • Ketorolac Tromethamine (SPRIX) 15.75 MG/SPRAY solution 1 spray to each nostril every 8 hours as needed for pain 5 each 0 Unknown at Unknown time     Current Meds:   [unfilled]  Allergies:  No Known Allergies  Immunizations:    There is no immunization history on file for this patient.  Social History:   Social History     Tobacco Use   • Smoking status: Never Smoker   • Smokeless tobacco: Never Used   Substance Use Topics   • Alcohol use: Yes     Comment: 10 drinks monthly on weekends      Family History:  Family History   Problem Relation Age of Onset   • Heart disease Father    • Malig Hyperthermia Neg Hx         Review of Systems  A comprehensive review of systems was negative.    Objective:  tMax 24 hrs: Temp (24hrs), Av.1 °F (36.7  °C), Min:98.1 °F (36.7 °C), Max:98.1 °F (36.7 °C)    Vitals Ranges:   Temp:  [98.1 °F (36.7 °C)] 98.1 °F (36.7 °C)  Heart Rate:  [72] 72  Resp:  [18] 18  BP: (115)/(69) 115/69  Intake and Output Last 3 Shifts:   No intake/output data recorded.    Exam:     General Appearance:    Alert, cooperative, no distress, appears stated age   Head:    Normocephalic, without obvious abnormality, atraumatic   Back:     Symmetric, no curvature, ROM normal, no CVA tenderness   Lungs:     Clear to auscultation bilaterally, respirations unlabored   Chest Wall:    No tenderness or deformity    Heart:    Regular rate and rhythm, S1 and S2 normal, no murmur, rub   or gallop       Abdomen:     Soft, non-tender, bowel sounds active all four quadrants,     no masses, no organomegaly           Extremities:   Extremities normal, atraumatic, no cyanosis or edema   Skin:   Skin color, texture, turgor normal, no rashes or lesions   Lymph nodes:   Cervical, supraclavicular, and axillary nodes normal       Data Review:    Lab Results (last 24 hours)     Procedure Component Value Units Date/Time    POC Pregnancy, Urine [[id number]]  (Normal) Collected:  07/01/19 1216    Specimen:  Urine Updated:  07/01/19 1251     HCG, Urine, QL Negative     Lot Number [id number]     Internal Positive Control Positive     Internal Negative Control Negative        Assessment:    Menorrhagia with regular cycle    Menorrhagia      Menorrhagia which is been refractory to conservative management.  Patient would like to proceed with an endometrial ablation.  Plan is for hysteroscopy, dilation curettage, NovaSure endometrial ablation.  She has been counseled regarding the benefits, risks and alternatives to this.  Her questions have been answered.  She would like to proceed.    Plan:  Hysteroscopy, dilation and curettage, NovaSure.    Karthik Steiner MD  7/1/2019

## 2019-07-01 NOTE — ANESTHESIA POSTPROCEDURE EVALUATION
Patient: Brittany Barragan    Procedure Summary     Date:  07/01/19 Room / Location:  [facility] OR 02 / [facility] MAIN OR    Anesthesia Start:  1350 Anesthesia Stop:  1427    Procedure:  HYSTEROSCOPY NOVASURE ENDOMETRIAL ABLATION (N/A Vagina) Diagnosis:       Menorrhagia with regular cycle      (Menorrhagia with regular cycle [N92.0])    Surgeon:  Karthik Steiner MD Provider:  Jose Guillermo MD    Anesthesia Type:  general ASA Status:  1          Anesthesia Type: general  Last vitals  BP   118/78 (07/01/19 1515)   Temp   36.6 °C (97.9 °F) (07/01/19 1430)   Pulse   71 (07/01/19 1515)   Resp   12 (07/01/19 1515)     SpO2   99 % (07/01/19 1515)     Post Anesthesia Care and Evaluation    Patient location during evaluation: PACU  Patient participation: complete - patient participated  Level of consciousness: awake and alert  Pain management: adequate  Airway patency: patent  Anesthetic complications: No anesthetic complications    Cardiovascular status: acceptable  Respiratory status: acceptable  Hydration status: acceptable    Comments: --------------------            07/01/19 1515     --------------------   BP:       118/78     Pulse:      71       Resp:       12       Temp:                SpO2:      99%      --------------------

## 2019-07-01 NOTE — ANESTHESIA PROCEDURE NOTES
Airway  Urgency: elective    Airway not difficult    General Information and Staff    Patient location during procedure: OR  CRNA: Hilda Canales CRNA    Indications and Patient Condition  Indications for airway management: airway protection    Preoxygenated: yes  MILS not maintained throughout  Mask difficulty assessment: 1 - vent by mask    Final Airway Details  Final airway type: supraglottic airway      Successful airway: classic  Size 4    Number of attempts at approach: 1    Additional Comments  Lma insertion appears atraumatic. Dentition intact.

## 2019-07-15 ENCOUNTER — OFFICE VISIT (OUTPATIENT)
Dept: OBSTETRICS AND GYNECOLOGY | Facility: CLINIC | Age: 34
End: 2019-07-15

## 2019-07-15 VITALS
SYSTOLIC BLOOD PRESSURE: 108 MMHG | BODY MASS INDEX: 23.34 KG/M2 | DIASTOLIC BLOOD PRESSURE: 62 MMHG | WEIGHT: 163 LBS | HEIGHT: 70 IN

## 2019-07-15 DIAGNOSIS — Z09 POSTOP CHECK: Primary | ICD-10-CM

## 2019-07-15 PROCEDURE — 99212 OFFICE O/P EST SF 10 MIN: CPT | Performed by: OBSTETRICS & GYNECOLOGY

## 2019-07-15 NOTE — PROGRESS NOTES
HPI   Brittany Barragan  is a 34 y.o. female who presents for a 2-week postoperative checkup.  She had an endometrial ablation.  She is feeling well.  Bowels and bladder are functioning normally.  Stop if pain is completely resolved.  She does continue to have a small amount of gold colored discharge.    Chief Complaint   Patient presents with   • Post-op     Patient is here for a 2 week postop for ablation.       Past Medical History:   Diagnosis Date   • Seasonal allergies        Past Surgical History:   Procedure Laterality Date   • BREAST AUGMENTATION     • BREAST AUGMENTATION     • D&C HYSTEROSCOPY ENDOMETRIAL ABLATION N/A 7/1/2019    Procedure: HYSTEROSCOPY NOVASURE ENDOMETRIAL ABLATION;  Surgeon: Karthik Steiner MD;  Location: [facility];  Service: Obstetrics/Gynecology   • EXPLORATORY LAPAROTOMY     • NASAL POLYP SURGERY         Social History     Socioeconomic History   • Marital status:      Spouse name: Karthik   • Number of children: 2   • Years of education: Not on file   • Highest education level: Not on file   Occupational History   • Occupation:    Tobacco Use   • Smoking status: Never Smoker   • Smokeless tobacco: Never Used   Substance and Sexual Activity   • Alcohol use: Yes     Comment: 10 drinks monthly on weekends   • Drug use: No       The following portions of the patient's history were reviewed and updated as appropriate: allergies, current medications, past family history, past medical history, past social history, past surgical history and problem list.    Review of Systems this is negative for nausea or vomiting.  Negative for fever or chills.  Negative for abdominal or pelvic pain.  All other systems are reviewed and are negative          Physical Exam   Constitutional: She appears well-developed and well-nourished.   Abdominal: Soft. She exhibits no distension and no mass. There is no tenderness.   Genitourinary:   Genitourinary Comments: Normal female external  genitalia  The vagina is estrogenized.  There is a small amount of gold discharge at the vaginal vault  Cervix is normal in appearance.  It is nontender to palpation  The uterus is mobile within the pelvis.  It is normal in size.  It is nontender.  No adnexal masses or tenderness are present   Skin: Skin is warm and dry.   Nursing note and vitals reviewed.      Assessment    Brittany was seen today for post-op.    Diagnoses and all orders for this visit:    Postop check        Plan  1. Appropriate progress, 2 weeks postop.  Okay to resume all normal activities of daily living.  The patient will delay until the discharge has resolved to swim in the lake.  2. Counseled regarding gold colored discharge.  Most likely, this represents normal postoperative discharge after an ablation.  If it does not resolve within the next 2 weeks, the patient will contact me and further evaluation will be performed.    3. Return in about 1 year (around 7/15/2020).    Social History     Tobacco Use   Smoking Status Never Smoker   4.     5.

## 2020-07-17 ENCOUNTER — OFFICE VISIT (OUTPATIENT)
Dept: OBSTETRICS AND GYNECOLOGY | Facility: CLINIC | Age: 35
End: 2020-07-17

## 2020-07-17 VITALS
WEIGHT: 164 LBS | SYSTOLIC BLOOD PRESSURE: 116 MMHG | HEIGHT: 70 IN | BODY MASS INDEX: 23.48 KG/M2 | DIASTOLIC BLOOD PRESSURE: 64 MMHG

## 2020-07-17 DIAGNOSIS — Z00.00 ANNUAL PHYSICAL EXAM: Primary | ICD-10-CM

## 2020-07-17 PROCEDURE — 99395 PREV VISIT EST AGE 18-39: CPT | Performed by: OBSTETRICS & GYNECOLOGY

## 2020-07-17 NOTE — PROGRESS NOTES
HPI   Brittany Barragan  is a 35 y.o. female who presents for a routine gynecologic exam.  She is feeling well.  Bowels and bladder are functioning normally.  The patient has been amenorrheic since her endometrial ablation and is very satisfied with this.    Chief Complaint   Patient presents with   • Gynecologic Exam     Patient is here for a annual.       Past Medical History:   Diagnosis Date   • Seasonal allergies        Past Surgical History:   Procedure Laterality Date   • BREAST AUGMENTATION     • BREAST AUGMENTATION     • D&C HYSTEROSCOPY ENDOMETRIAL ABLATION N/A 7/1/2019    Procedure: HYSTEROSCOPY NOVASURE ENDOMETRIAL ABLATION;  Surgeon: Karthik Steiner MD;  Location: [facility];  Service: Obstetrics/Gynecology   • EXPLORATORY LAPAROTOMY     • NASAL POLYP SURGERY         Social History     Socioeconomic History   • Marital status:      Spouse name: Karthik   • Number of children: 2   • Years of education: Not on file   • Highest education level: Not on file   Occupational History   • Occupation:    Tobacco Use   • Smoking status: Never Smoker   • Smokeless tobacco: Never Used   Substance and Sexual Activity   • Alcohol use: Yes     Comment: 10 drinks monthly on weekends   • Drug use: No       The following portions of the patient's history were reviewed and updated as appropriate: allergies, current medications, past family history, past medical history, past social history, past surgical history and problem list.    Review of Systems   Constitutional: Negative.    HENT: Negative.    Respiratory: Negative.    Cardiovascular: Negative.    Gastrointestinal: Negative.    Genitourinary: Negative.    Musculoskeletal: Negative.    Skin: Negative.    Allergic/Immunologic: Negative.    Psychiatric/Behavioral: Negative.              Physical Exam   Constitutional: She is oriented to person, place, and time. She appears well-developed and well-nourished.   HENT:   Head: Normocephalic and  atraumatic.   Cardiovascular: Normal rate and regular rhythm.   Pulmonary/Chest: Effort normal and breath sounds normal. She has no wheezes. She has no rales.   The breasts are homogeneous.  There are no palpable lumps.  Nipple discharge and axillary adenopathy are absent.   Abdominal: Soft. She exhibits no distension. There is no tenderness.   Genitourinary: Vagina normal and uterus normal. There is no lesion on the right labia. There is no lesion on the left labia. Cervix exhibits no motion tenderness. Right adnexum displays no mass and no tenderness. Left adnexum displays no mass and no tenderness. No vaginal discharge found.   Neurological: She is alert and oriented to person, place, and time.   Skin: Skin is warm and dry.   Nursing note and vitals reviewed.      Kimberly Soto was seen today for gynecologic exam.    Diagnoses and all orders for this visit:    Annual physical exam        Plan  1. Annual examination performed  2. Counseled regarding the frequency of Pap smears.  The patient would like to consider less frequent Pap smears.  We discussed the option of changing to every third year after 3 negative Paps.     3. Return in about 1 year (around 7/17/2021).    Social History     Tobacco Use   Smoking Status Never Smoker   4.

## 2020-07-21 LAB
CONV .: NORMAL
CYTOLOGIST CVX/VAG CYTO: NORMAL
CYTOLOGY CVX/VAG DOC CYTO: NORMAL
CYTOLOGY CVX/VAG DOC THIN PREP: NORMAL
DX ICD CODE: NORMAL
HIV 1 & 2 AB SER-IMP: NORMAL
OTHER STN SPEC: NORMAL
STAT OF ADQ CVX/VAG CYTO-IMP: NORMAL

## 2021-07-19 ENCOUNTER — OFFICE VISIT (OUTPATIENT)
Dept: OBSTETRICS AND GYNECOLOGY | Facility: CLINIC | Age: 36
End: 2021-07-19

## 2021-07-19 VITALS
HEIGHT: 70 IN | BODY MASS INDEX: 24.05 KG/M2 | SYSTOLIC BLOOD PRESSURE: 116 MMHG | DIASTOLIC BLOOD PRESSURE: 64 MMHG | WEIGHT: 168 LBS

## 2021-07-19 DIAGNOSIS — Z00.00 ANNUAL PHYSICAL EXAM: Primary | ICD-10-CM

## 2021-07-19 PROCEDURE — 99395 PREV VISIT EST AGE 18-39: CPT | Performed by: OBSTETRICS & GYNECOLOGY

## 2021-07-19 NOTE — PROGRESS NOTES
HPI   Brittany Barragan  is a 36 y.o. female who presents for a routine gynecologic exam.  She is feeling well.  Bowels and bladder are functioning normally.  She has only rare spotting since her endometrial ablation.  No family history of breast cancer.    Chief Complaint   Patient presents with   • Gynecologic Exam     Patient is here for a annual.       Past Medical History:   Diagnosis Date   • Seasonal allergies        Past Surgical History:   Procedure Laterality Date   • BREAST AUGMENTATION     • BREAST AUGMENTATION     • D & C HYSTEROSCOPY ENDOMETRIAL ABLATION N/A 7/1/2019    Procedure: HYSTEROSCOPY NOVASURE ENDOMETRIAL ABLATION;  Surgeon: Karthik Steiner MD;  Location: [facility];  Service: Obstetrics/Gynecology   • EXPLORATORY LAPAROTOMY     • NASAL POLYP SURGERY         Social History     Socioeconomic History   • Marital status:      Spouse name: Karthik   • Number of children: 2   • Years of education: Not on file   • Highest education level: Not on file   Tobacco Use   • Smoking status: Never Smoker   • Smokeless tobacco: Never Used   Substance and Sexual Activity   • Alcohol use: Yes     Comment: 10 drinks monthly on weekends   • Drug use: No       The following portions of the patient's history were reviewed and updated as appropriate: allergies, current medications, past family history, past medical history, past social history, past surgical history and problem list.    Review of Systems   Constitutional: Negative.    HENT: Negative.    Respiratory: Negative.    Cardiovascular: Negative.    Gastrointestinal: Negative.    Genitourinary: Negative.    Musculoskeletal: Negative.    Skin: Negative.    Allergic/Immunologic: Negative.    Psychiatric/Behavioral: Negative.              Physical Exam  Vitals and nursing note reviewed.   Constitutional:       Appearance: She is well-developed.   HENT:      Head: Normocephalic and atraumatic.   Cardiovascular:      Rate and Rhythm: Normal rate and  regular rhythm.   Pulmonary:      Effort: Pulmonary effort is normal.      Breath sounds: Normal breath sounds. No wheezing or rales.   Chest:      Comments: Breast implants are in place bilaterally.  There are no palpable breast lumps.  Nipple discharge and axillary adenopathy are absent.  Abdominal:      General: There is no distension.      Palpations: Abdomen is soft.      Tenderness: There is no abdominal tenderness.   Genitourinary:     Labia:         Right: No lesion.         Left: No lesion.       Vagina: Normal. No vaginal discharge.      Cervix: No cervical motion tenderness.      Uterus: Normal. Not enlarged and not tender.       Adnexa:         Right: No mass or tenderness.          Left: No mass or tenderness.     Skin:     General: Skin is warm and dry.   Neurological:      Mental Status: She is alert and oriented to person, place, and time.         Assessment    Diagnoses and all orders for this visit:    1. Annual physical exam (Primary)        Plan  1. Annual examination performed  2. Counseled regarding a [facility] recommendations for mammography every 1 to 2 years between the ages of 40 and 50.  The patient does not have a family history of breast cancer and plans to begin mammography at age 40.  3. Counseled regarding frequency of Pap smears.  The patient is a non-smoker and in a monogamous relationship.  She does not have a family history of cervical cancer, but does have a personal history of dysplasia.  We discussed the benefits and risks of yearly Pap smears versus a frequency of every third year or after the third negative Pap.  The patient is considering her options, but is leaning towards yearly Paps.    4. Return in about 1 year (around 7/19/2022).    Social History     Tobacco Use   Smoking Status Never Smoker   5.

## 2021-09-03 NOTE — TELEPHONE ENCOUNTER
----- Message from Karthik Steiner MD sent at 6/4/2019  3:15 PM EDT -----  I have been unable to reach the patient by phone.  Please let her know that her endometrium biopsy was negative.  Please confirm that she is scheduled for surgery next month.  Thank you  
No

## 2022-07-25 ENCOUNTER — OFFICE VISIT (OUTPATIENT)
Dept: OBSTETRICS AND GYNECOLOGY | Facility: CLINIC | Age: 37
End: 2022-07-25

## 2022-07-25 VITALS
DIASTOLIC BLOOD PRESSURE: 62 MMHG | HEIGHT: 69 IN | WEIGHT: 175 LBS | BODY MASS INDEX: 25.92 KG/M2 | SYSTOLIC BLOOD PRESSURE: 116 MMHG

## 2022-07-25 DIAGNOSIS — Z00.00 ANNUAL PHYSICAL EXAM: Primary | ICD-10-CM

## 2022-07-25 PROCEDURE — 99395 PREV VISIT EST AGE 18-39: CPT | Performed by: OBSTETRICS & GYNECOLOGY

## 2022-07-25 NOTE — PROGRESS NOTES
HPI   Brittany Barragan  is a 37 y.o. female who presents for a routine gynecologic exam.  Overall, she is feeling well.  She is amenorrheic after her endometrial ablation.  Bowels and bladder are functioning normally.    Chief Complaint   Patient presents with   • Gynecologic Exam     Patient is here for a annual.       Past Medical History:   Diagnosis Date   • Seasonal allergies        Past Surgical History:   Procedure Laterality Date   • BREAST AUGMENTATION     • BREAST AUGMENTATION     • D & C HYSTEROSCOPY ENDOMETRIAL ABLATION N/A 7/1/2019    Procedure: HYSTEROSCOPY NOVASURE ENDOMETRIAL ABLATION;  Surgeon: Karthik Steiner MD;  Location[facility];  Service: Obstetrics/Gynecology   • EXPLORATORY LAPAROTOMY     • NASAL POLYP SURGERY         Social History     Socioeconomic History   • Marital status:      Spouse name: Karthik   • Number of children: 2   Tobacco Use   • Smoking status: Never Smoker   • Smokeless tobacco: Never Used   Substance and Sexual Activity   • Alcohol use: Yes     Comment: 10 drinks monthly on weekends   • Drug use: No       The following portions of the patient's history were reviewed and updated as appropriate: allergies, current medications, past family history, past medical history, past social history, past surgical history and problem list.    Review of Systems   Constitutional: Negative.    HENT: Negative.    Respiratory: Negative.    Cardiovascular: Negative.    Gastrointestinal: Negative.    Genitourinary: Negative.    Musculoskeletal: Negative.    Skin: Negative.    Allergic/Immunologic: Negative.    Psychiatric/Behavioral: Negative.              Physical Exam  Vitals and nursing note reviewed.   Constitutional:       Appearance: She is well-developed.   HENT:      Head: Normocephalic and atraumatic.   Cardiovascular:      Rate and Rhythm: Normal rate and regular rhythm.   Pulmonary:      Effort: Pulmonary effort is normal.      Breath sounds: Normal breath sounds. No  wheezing or rales.   Chest:      Comments: The breasts are homogeneous.  There are no palpable lumps.  Nipple discharge and axillary adenopathy are absent.  Abdominal:      General: There is no distension.      Palpations: Abdomen is soft.      Tenderness: There is no abdominal tenderness.   Genitourinary:     Labia:         Right: No lesion.         Left: No lesion.       Vagina: Normal. No vaginal discharge.      Cervix: No cervical motion tenderness.      Uterus: Normal. Not enlarged and not tender.       Adnexa:         Right: No mass or tenderness.          Left: No mass or tenderness.     Skin:     General: Skin is warm and dry.   Neurological:      Mental Status: She is alert and oriented to person, place, and time.         Assessment    Diagnoses and all orders for this visit:    1. Annual physical exam (Primary)        Plan  1. Annual examination performed  2. Counseled regarding the frequency of Pap smears.  The benefits and risks of doing a Pap every year versus every third year after 3 normals discussed with the patient.  She has a history of cervical dysplasia in the past.  For now, she would like to continue yearly Pap smears.    3. Return in about 1 year (around 7/25/2023).    Social History     Tobacco Use   Smoking Status Never Smoker   Smokeless Tobacco Never Used   4.

## 2023-10-04 ENCOUNTER — OFFICE VISIT (OUTPATIENT)
Dept: OBSTETRICS AND GYNECOLOGY | Facility: CLINIC | Age: 38
End: 2023-10-04
Payer: COMMERCIAL

## 2023-10-04 VITALS — HEIGHT: 70 IN | BODY MASS INDEX: 25.05 KG/M2 | WEIGHT: 175 LBS

## 2023-10-04 DIAGNOSIS — Z83.49 FAMILY HISTORY OF HYPOTHYROIDISM: ICD-10-CM

## 2023-10-04 DIAGNOSIS — Z00.00 ANNUAL PHYSICAL EXAM: Primary | ICD-10-CM

## 2023-10-04 NOTE — PROGRESS NOTES
HPI   Brittany aBrragan  is a 38 y.o. female who presents for 2 reasons.  First, she would like to have a routine gynecologic exam.  Bowels and bladder are functioning normally.  The patient is amenorrheic since her endometrial ablation.  Next, the patient has been experiencing cold intolerance, fatigue and weight gain.  She is concerned that there may be a hormonal imbalance.  We reviewed her lab results from her primary care physician.  Hemoglobin is normal chemistry profile was also normal.  Thyroid-stimulating hormone, T3 and T4 were normal as well.  The patient remains symptomatic and is concerned.    Chief Complaint   Patient presents with    Gynecologic Exam     Patient is here for a annual.       Past Medical History:   Diagnosis Date    Seasonal allergies        Past Surgical History:   Procedure Laterality Date    BREAST AUGMENTATION      BREAST AUGMENTATION      D & C HYSTEROSCOPY ENDOMETRIAL ABLATION N/A 7/1/2019    Procedure: HYSTEROSCOPY NOVASURE ENDOMETRIAL ABLATION;  Surgeon: Karthik Steiner MD;  Location: [facility];  Service: Obstetrics/Gynecology    EXPLORATORY LAPAROTOMY      NASAL POLYP SURGERY         Social History     Socioeconomic History    Marital status:      Spouse name: Karthik    Number of children: 2   Tobacco Use    Smoking status: Never    Smokeless tobacco: Never   Substance and Sexual Activity    Alcohol use: Yes     Comment: 10 drinks monthly on weekends    Drug use: No       The following portions of the patient's history were reviewed and updated as appropriate: allergies, current medications, past family history, past medical history, past social history, past surgical history and problem list.    Review of Systems     This is positive for fatigue.  Positive for weight gain.  Positive for cold intolerance.  Negative for fever or chills.  Negative for nausea or vomiting.  All other systems are reviewed and are negative.    Physical Exam  Vitals and nursing note  reviewed.   Constitutional:       Appearance: She is well-developed.   HENT:      Head: Normocephalic and atraumatic.   Cardiovascular:      Rate and Rhythm: Normal rate and regular rhythm.   Pulmonary:      Effort: Pulmonary effort is normal.      Breath sounds: Normal breath sounds. No wheezing or rales.   Chest:      Comments: Breast implants are in place bilaterally.  There are no palpable breast lumps.  Nipple discharge and axillary adenopathy are absent.  Abdominal:      General: There is no distension.      Palpations: Abdomen is soft.      Tenderness: There is no abdominal tenderness.   Genitourinary:     Labia:         Right: No lesion.         Left: No lesion.       Vagina: Normal. No vaginal discharge.      Cervix: No cervical motion tenderness.      Uterus: Normal. Not enlarged and not tender.       Adnexa:         Right: No mass or tenderness.          Left: No mass or tenderness.     Skin:     General: Skin is warm and dry.   Neurological:      Mental Status: She is alert and oriented to person, place, and time.       Assessment    Diagnoses and all orders for this visit:    1. Annual physical exam (Primary)  -     IGP, Rfx Aptima HPV ASCU    2. Family history of hypothyroidism  -     Ambulatory Referral to Endocrinology        Plan  Annual examination performed  Counseled regarding ACOG recommendations for mammography every 1 to 2 years beginning at age 40.  Fatigue, weight gain and cold intolerance.  The patient has a family history of hypothyroidism and has many symptoms of the condition.  This is despite normal thyroid function tests in June.  I counseled the patient and answered her questions.  I recommend an endocrinology consult for further evaluation, as the symptoms are concerning.  Additional testing may be helpful.    No follow-ups on file.    Social History     Tobacco Use   Smoking Status Never   Smokeless Tobacco Never

## 2023-12-13 ENCOUNTER — OFFICE VISIT (OUTPATIENT)
Dept: ENDOCRINOLOGY | Age: 38
End: 2023-12-13
Payer: COMMERCIAL

## 2023-12-13 VITALS
HEART RATE: 75 BPM | OXYGEN SATURATION: 99 % | TEMPERATURE: 96.8 F | WEIGHT: 179 LBS | DIASTOLIC BLOOD PRESSURE: 80 MMHG | SYSTOLIC BLOOD PRESSURE: 120 MMHG | BODY MASS INDEX: 26.05 KG/M2

## 2023-12-13 DIAGNOSIS — Z83.49 FAMILY HISTORY OF THYROID DISEASE: Primary | ICD-10-CM

## 2023-12-13 DIAGNOSIS — R68.89 COLD FEELING: ICD-10-CM

## 2023-12-13 DIAGNOSIS — R63.5 WEIGHT GAIN: ICD-10-CM

## 2023-12-13 DIAGNOSIS — N92.0 MENORRHAGIA WITH REGULAR CYCLE: ICD-10-CM

## 2023-12-13 DIAGNOSIS — R53.82 CHRONIC FATIGUE: ICD-10-CM

## 2023-12-13 DIAGNOSIS — L65.9 HAIR LOSS: ICD-10-CM

## 2023-12-13 PROCEDURE — 99244 OFF/OP CNSLTJ NEW/EST MOD 40: CPT | Performed by: NURSE PRACTITIONER

## 2023-12-13 RX ORDER — LORATADINE 10 MG/1
10 TABLET ORAL DAILY
COMMUNITY

## 2023-12-13 NOTE — PROGRESS NOTES
"Chief Complaint  Fatigue    Subjective        Brittany Barragan presents to [facility] ENDOCRINOLOGY  History of Present Illness    Patient is seen in consult today for Z83.49 (ICD-10-CM) - Family history of hypothyroidism sent by Karthik Steiner MD     [facility] s/f uterine ablation 2019    Reports she started her menstrual cycle later than her peers   Some of her symptoms have been going on since her teenage years   She did not have a hard time getting pregnant     S/s include: cold intolerance, fatigue, weight gain and night sweats, low sex drive, hair loss   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    Weight as of this encounter: 81.2 kg (179 lb).             Physical Exam  Vitals reviewed.   Constitutional:       General: She is not in acute distress.  HENT:      Head: Normocephalic and atraumatic.   Cardiovascular:      Rate and Rhythm: Normal rate.   Pulmonary:      Effort: Pulmonary effort is normal. No respiratory distress.   Musculoskeletal:         General: No signs of injury. Normal range of motion.      Cervical back: Normal range of motion and neck supple.   Skin:     General: Skin is warm and dry.   Neurological:      Mental Status: She is alert and oriented to person, place, and time. Mental status is at baseline.   Psychiatric:         Mood and Affect: Mood normal.         Behavior: Behavior normal.         Thought Content: Thought content normal.         Judgment: Judgment normal.        Result Review :  The following data was reviewed by: AUDI Ambrosio on 12/13/2023:               "   Assessment and Plan   Diagnoses and all orders for this visit:    1. Family history of thyroid disease (Primary)  -     TSH  -     T4, Free  -     T3, Free  -     Thyroglobulin Antibody  -     Thyroid Peroxidase Antibody  -     Thyroid Stimulating Immunoglobulin  -     ACTH  -     Cortisol  -     Prolactin  -     FSH & LH  -     C-Peptide  -     Comprehensive Metabolic Panel  -     Vitamin B12 & Folate  -     Vitamin D,25-Hydroxy  -     Testosterone    2. Menorrhagia with regular cycle  -     TSH  -     T4, Free  -     T3, Free  -     Thyroglobulin Antibody  -     Thyroid Peroxidase Antibody  -     Thyroid Stimulating Immunoglobulin  -     ACTH  -     Cortisol  -     Prolactin  -     FSH & LH  -     C-Peptide  -     Comprehensive Metabolic Panel  -     Vitamin B12 & Folate  -     Vitamin D,25-Hydroxy  -     Testosterone    3. Chronic fatigue  -     TSH  -     T4, Free  -     T3, Free  -     Thyroglobulin Antibody  -     Thyroid Peroxidase Antibody  -     Thyroid Stimulating Immunoglobulin  -     ACTH  -     Cortisol  -     Prolactin  -     FSH & LH  -     C-Peptide  -     Comprehensive Metabolic Panel  -     Vitamin B12 & Folate  -     Vitamin D,25-Hydroxy  -     Testosterone    4. Hair loss  -     TSH  -     T4, Free  -     T3, Free  -     Thyroglobulin Antibody  -     Thyroid Peroxidase Antibody  -     Thyroid Stimulating Immunoglobulin  -     ACTH  -     Cortisol  -     Prolactin  -     FSH & LH  -     C-Peptide  -     Comprehensive Metabolic Panel  -     Vitamin B12 & Folate  -     Vitamin D,25-Hydroxy  -     Testosterone    5. Cold feeling  -     TSH  -     T4, Free  -     T3, Free  -     Thyroglobulin Antibody  -     Thyroid Peroxidase Antibody  -     Thyroid Stimulating Immunoglobulin  -     ACTH  -     Cortisol  -     Prolactin  -     FSH & LH  -     C-Peptide  -     Comprehensive Metabolic Panel  -     Vitamin B12 & Folate  -     Vitamin D,25-Hydroxy  -     Testosterone    6. Weight gain  -     TSH  -      T4, Free  -     T3, Free  -     Thyroglobulin Antibody  -     Thyroid Peroxidase Antibody  -     Thyroid Stimulating Immunoglobulin  -     ACTH  -     Cortisol  -     Prolactin  -     FSH & LH  -     C-Peptide  -     Comprehensive Metabolic Panel  -     Vitamin B12 & Folate  -     Vitamin D,25-Hydroxy  -     Testosterone             Follow Up   No follow-ups on file.    Extensive lab work up done to determine if reported s/s are endocrine related in etiology   Will discuss findings with patient and if any further recommendations based on these results     Patient was given instructions and counseling regarding her condition or for health maintenance advice. Please see specific information pulled into the AVS if appropriate.     Jeana Richard, APRN

## 2023-12-15 LAB
25(OH)D3+25(OH)D2 SERPL-MCNC: 45.2 NG/ML (ref 30–100)
ACTH PLAS-MCNC: 25.2 PG/ML (ref 7.2–63.3)
ALBUMIN SERPL-MCNC: 4.7 G/DL (ref 3.9–4.9)
ALBUMIN/GLOB SERPL: 1.9 {RATIO} (ref 1.2–2.2)
ALP SERPL-CCNC: 63 IU/L (ref 44–121)
ALT SERPL-CCNC: 10 IU/L (ref 0–32)
AST SERPL-CCNC: 18 IU/L (ref 0–40)
BILIRUB SERPL-MCNC: 0.6 MG/DL (ref 0–1.2)
BUN SERPL-MCNC: 20 MG/DL (ref 6–20)
BUN/CREAT SERPL: 25 (ref 9–23)
C PEPTIDE SERPL-MCNC: 3.8 NG/ML (ref 1.1–4.4)
CALCIUM SERPL-MCNC: 9.2 MG/DL (ref 8.7–10.2)
CHLORIDE SERPL-SCNC: 100 MMOL/L (ref 96–106)
CO2 SERPL-SCNC: 23 MMOL/L (ref 20–29)
CORTIS SERPL-MCNC: 8.3 UG/DL (ref 6.2–19.4)
CREAT SERPL-MCNC: 0.81 MG/DL (ref 0.57–1)
EGFRCR SERPLBLD CKD-EPI 2021: 95 ML/MIN/1.73
FOLATE SERPL-MCNC: 18.6 NG/ML
FSH SERPL-ACNC: 4.4 MIU/ML
GLOBULIN SER CALC-MCNC: 2.5 G/DL (ref 1.5–4.5)
GLUCOSE SERPL-MCNC: 102 MG/DL (ref 70–99)
LH SERPL-ACNC: 11.2 MIU/ML
POTASSIUM SERPL-SCNC: 3.9 MMOL/L (ref 3.5–5.2)
PROLACTIN SERPL-MCNC: 16.3 NG/ML (ref 4.8–23.3)
PROT SERPL-MCNC: 7.2 G/DL (ref 6–8.5)
SODIUM SERPL-SCNC: 136 MMOL/L (ref 134–144)
T3FREE SERPL-MCNC: 3.2 PG/ML (ref 2–4.4)
T4 FREE SERPL-MCNC: 1.16 NG/DL (ref 0.82–1.77)
TESTOST SERPL-MCNC: 17 NG/DL (ref 8–60)
THYROGLOB AB SERPL-ACNC: <1 IU/ML (ref 0–0.9)
THYROPEROXIDASE AB SERPL-ACNC: 16 IU/ML (ref 0–34)
TSH SERPL DL<=0.005 MIU/L-ACNC: 0.95 UIU/ML (ref 0.45–4.5)
TSI SER-ACNC: <0.1 IU/L (ref 0–0.55)
VIT B12 SERPL-MCNC: 800 PG/ML (ref 232–1245)

## 2024-11-05 ENCOUNTER — OFFICE VISIT (OUTPATIENT)
Dept: OBSTETRICS AND GYNECOLOGY | Facility: CLINIC | Age: 39
End: 2024-11-05
Payer: COMMERCIAL

## 2024-11-05 VITALS — DIASTOLIC BLOOD PRESSURE: 80 MMHG | WEIGHT: 165 LBS | BODY MASS INDEX: 24.02 KG/M2 | SYSTOLIC BLOOD PRESSURE: 120 MMHG

## 2024-11-05 DIAGNOSIS — R61 NIGHT SWEATS: ICD-10-CM

## 2024-11-05 DIAGNOSIS — R53.83 OTHER FATIGUE: ICD-10-CM

## 2024-11-05 DIAGNOSIS — Z00.00 ANNUAL PHYSICAL EXAM: Primary | ICD-10-CM

## 2024-11-05 RX ORDER — EPINEPHRINE 0.3 MG/.3ML
INJECTION, SOLUTION INTRAMUSCULAR
COMMUNITY
Start: 2024-07-31

## 2024-11-05 NOTE — PROGRESS NOTES
HPI   Brittany Barragan  is a 39 y.o. female who presents for a routine gynecologic exam.  Bowels and bladder are functioning normally.  The patient is amenorrheic since her endometrial ablation.  She does complain of night sweats and sleeplessness.  She also has fatigue.  The fatigue has improved with weight loss and increased aerobic exercise, but night sweats and sleeplessness persist.  The patient had thyroid testing last year which was negative.    Chief Complaint   Patient presents with    Annual Exam     Last ae 23 normal        Past Medical History:   Diagnosis Date    Abnormal Pap smear of cervix     PMS (premenstrual syndrome)     Seasonal allergies     Urinary tract infection     Varicella        Past Surgical History:   Procedure Laterality Date    BREAST AUGMENTATION      BREAST AUGMENTATION      CERVICAL BIOPSY  W/ LOOP ELECTRODE EXCISION      D & C HYSTEROSCOPY ENDOMETRIAL ABLATION N/A 07/01/2019    Procedure: HYSTEROSCOPY NOVASURE ENDOMETRIAL ABLATION;  Surgeon: Karthik Steiner MD;  Location: [facility];  Service: Obstetrics/Gynecology    EXPLORATORY LAPAROTOMY      NASAL POLYP SURGERY      WISDOM TOOTH EXTRACTION         Social History     Socioeconomic History    Marital status:      Spouse name: Karthik    Number of children: 2   Tobacco Use    Smoking status: Never    Smokeless tobacco: Never   Substance and Sexual Activity    Alcohol use: Yes     Comment: Maybe once a month socially only    Drug use: No    Sexual activity: Yes     Partners: Male     Birth control/protection: Vasectomy     Comment: Ablasion too       The following portions of the patient's history were reviewed and updated as appropriate: allergies, current medications, past family history, past medical history, past social history, past surgical history and problem list.    Review of Systems     This is positive for night sweats.  Positive for fatigue.  Negative for unexplained weight change.  Negative for itching.   All other systems are reviewed and are negative.    Physical Exam  Vitals and nursing note reviewed.   Constitutional:       Appearance: She is well-developed.   HENT:      Head: Normocephalic and atraumatic.   Cardiovascular:      Rate and Rhythm: Normal rate and regular rhythm.   Pulmonary:      Effort: Pulmonary effort is normal.      Breath sounds: Normal breath sounds. No wheezing or rales.   Chest:      Comments: Implants are in place bilaterally.  There are no palpable breast lumps.  Nipple discharge and axillary adenopathy are absent.  Abdominal:      General: There is no distension.      Palpations: Abdomen is soft.      Tenderness: There is no abdominal tenderness.   Genitourinary:     Labia:         Right: No lesion.         Left: No lesion.       Vagina: Normal. No vaginal discharge.      Cervix: No cervical motion tenderness.      Uterus: Normal. Not enlarged and not tender.       Adnexa:         Right: No mass or tenderness.          Left: No mass or tenderness.     Skin:     General: Skin is warm and dry.   Neurological:      Mental Status: She is alert and oriented to person, place, and time.         Assessment    Diagnoses and all orders for this visit:    1. Annual physical exam (Primary)    2. Other fatigue  -     Follicle Stimulating Hormone    3. Night sweats  -     Follicle Stimulating Hormone        Plan  Annual examination performed  Counseled regarding ACOG recommendations for mammography every 1 to 2 years between the ages of 40 and 50.  The patient plans to schedule a mammogram as part of her annual examination next year.  Night sweats, mood swings and fatigue.  Counseled and questions answered.  The patient had some improvement with increased aerobic exercise and weight loss, but continues to have sleeplessness and mood swings.  Thyroid workup was negative last year.  We discussed the possibility of early menopause.  I recommend a follicle-stimulating hormone.  If this is elevated,  symptoms may respond to hormone replacement.  If so, we discussed the benefits, risks and alternatives to the use of hormone replacement.  Data from the I were reviewed and questions were answered.  Further recommendations pending the results of the patient's blood test.    Return in about 1 year (around 11/5/2025).    Social History     Tobacco Use   Smoking Status Never   Smokeless Tobacco Never

## 2024-11-06 LAB — FSH SERPL-ACNC: 3.3 MIU/ML

## 2024-11-08 LAB
CONV .: NORMAL
CYTOLOGIST CVX/VAG CYTO: NORMAL
CYTOLOGY CVX/VAG DOC CYTO: NORMAL
CYTOLOGY CVX/VAG DOC THIN PREP: NORMAL
DX ICD CODE: NORMAL
Lab: NORMAL
OTHER STN SPEC: NORMAL
STAT OF ADQ CVX/VAG CYTO-IMP: NORMAL

## 2025-06-17 ENCOUNTER — PROCEDURE VISIT (OUTPATIENT)
Dept: OBSTETRICS AND GYNECOLOGY | Facility: CLINIC | Age: 40
End: 2025-06-17
Payer: COMMERCIAL

## 2025-06-17 DIAGNOSIS — Z12.31 VISIT FOR SCREENING MAMMOGRAM: Primary | ICD-10-CM

## (undated) DEVICE — STRAP STIRUP WO/ RNG

## (undated) DEVICE — PROB ABL ENDOMTRL NOVASURE/G4 W/SURESND

## (undated) DEVICE — SOL NACL 0.9PCT 1000ML

## (undated) DEVICE — PAD SANI MAXI W/ADHS SNG WRP 11IN

## (undated) DEVICE — GLV SURG TRIUMPH CLASSIC PF LTX 8 STRL

## (undated) DEVICE — SKIN PREP TRAY W/CHG: Brand: MEDLINE INDUSTRIES, INC.

## (undated) DEVICE — LOU D & C HYSTEROSCOPY: Brand: MEDLINE INDUSTRIES, INC.

## (undated) DEVICE — ST IRR CYSTO W/SPK 77IN LF

## (undated) DEVICE — GLV SURG BIOGEL LTX PF 7 1/2

## (undated) DEVICE — NDL SPINE 22G 31/2IN BLK